# Patient Record
Sex: MALE | Race: WHITE | NOT HISPANIC OR LATINO | Employment: OTHER | URBAN - METROPOLITAN AREA
[De-identification: names, ages, dates, MRNs, and addresses within clinical notes are randomized per-mention and may not be internally consistent; named-entity substitution may affect disease eponyms.]

---

## 2017-01-05 ENCOUNTER — TELEPHONE (OUTPATIENT)
Dept: UROLOGY | Facility: CLINIC | Age: 67
End: 2017-01-05

## 2017-01-05 ENCOUNTER — LAB VISIT (OUTPATIENT)
Dept: LAB | Facility: HOSPITAL | Age: 67
End: 2017-01-05
Attending: UROLOGY
Payer: MEDICARE

## 2017-01-05 DIAGNOSIS — N39.0 URINARY TRACT INFECTION, SITE UNSPECIFIED: ICD-10-CM

## 2017-01-05 DIAGNOSIS — N39.0 URINARY TRACT INFECTION, SITE UNSPECIFIED: Primary | ICD-10-CM

## 2017-01-05 PROCEDURE — 87086 URINE CULTURE/COLONY COUNT: CPT

## 2017-01-05 PROCEDURE — 87088 URINE BACTERIA CULTURE: CPT

## 2017-01-05 PROCEDURE — 87077 CULTURE AEROBIC IDENTIFY: CPT

## 2017-01-05 PROCEDURE — 87186 SC STD MICRODIL/AGAR DIL: CPT

## 2017-01-05 NOTE — TELEPHONE ENCOUNTER
----- Message from Jose L Mcallister sent at 1/5/2017 10:18 AM CST -----  Contact: Patient's wife  Mrs. Menjivar states that the patient's urine sample has been delivered to the lab. Please call Mrs. Menjivar back at [phone] if you have any questions. Thanks.

## 2017-01-05 NOTE — TELEPHONE ENCOUNTER
Spoke to pt's wife and she believes her  has a uti. He has an odor to the urine and some burning. He is going to drop off a urine sample to the lab today for a urinalysis and a culture to be done.

## 2017-01-05 NOTE — TELEPHONE ENCOUNTER
----- Message from Steve Monae sent at 1/5/2017  8:18 AM CST -----  Wife (Екатерина) called stated that the pt is claiming of burning and smell when urinating and she wants to know does she need to be his urine to be tested/pls call back at [phone]

## 2017-01-06 ENCOUNTER — TELEPHONE (OUTPATIENT)
Dept: UROLOGY | Facility: CLINIC | Age: 67
End: 2017-01-06

## 2017-01-06 NOTE — TELEPHONE ENCOUNTER
Wife called to check on urine culture. This is pending and patient will be notified on Monday. For now he can continue his medications at home and take AZO if needed.

## 2017-01-08 LAB — BACTERIA UR CULT: NORMAL

## 2017-01-09 DIAGNOSIS — N39.0 URINARY TRACT INFECTION WITH HEMATURIA, SITE UNSPECIFIED: Primary | ICD-10-CM

## 2017-01-09 DIAGNOSIS — R31.9 URINARY TRACT INFECTION WITH HEMATURIA, SITE UNSPECIFIED: Primary | ICD-10-CM

## 2017-01-09 RX ORDER — NITROFURANTOIN 25; 75 MG/1; MG/1
100 CAPSULE ORAL 2 TIMES DAILY
Qty: 20 CAPSULE | Refills: 0 | Status: SHIPPED | OUTPATIENT
Start: 2017-01-09 | End: 2017-01-19

## 2017-01-09 NOTE — TELEPHONE ENCOUNTER
Orders for the Macrobid for the urine culture results showing an infection. Pt's wife was informed of urine culture results.

## 2017-01-09 NOTE — TELEPHONE ENCOUNTER
----- Message from Britni Briscoe sent at 1/9/2017  9:51 AM CST -----  Contact: Wife - Екатерина  Patient wife called to request results of recent test - Please call to render at [phone]

## 2017-01-09 NOTE — TELEPHONE ENCOUNTER
Wife informed to check with the pharmacy this afternoon for patient's medication. No follow up is needed at this time.

## 2017-01-10 ENCOUNTER — TELEPHONE (OUTPATIENT)
Dept: UROLOGY | Facility: CLINIC | Age: 67
End: 2017-01-10

## 2017-07-21 PROBLEM — L02.212 ABSCESS OF BACK: Status: ACTIVE | Noted: 2017-07-21

## 2017-07-31 PROBLEM — S21.209A OPEN BACK WOUND: Status: ACTIVE | Noted: 2017-07-31

## 2020-02-03 PROBLEM — I83.019 VENOUS ULCER OF RIGHT LEG: Status: ACTIVE | Noted: 2020-02-03

## 2020-02-03 PROBLEM — L02.212 ABSCESS OF BACK: Status: RESOLVED | Noted: 2017-07-21 | Resolved: 2020-02-03

## 2020-02-03 PROBLEM — L97.919 VENOUS ULCER OF RIGHT LEG: Status: ACTIVE | Noted: 2020-02-03

## 2021-01-15 ENCOUNTER — IMMUNIZATION (OUTPATIENT)
Dept: FAMILY MEDICINE | Facility: CLINIC | Age: 71
End: 2021-01-15
Payer: MEDICARE

## 2021-01-15 DIAGNOSIS — Z23 NEED FOR VACCINATION: Primary | ICD-10-CM

## 2021-01-15 PROCEDURE — 91300 COVID-19, MRNA, LNP-S, PF, 30 MCG/0.3 ML DOSE VACCINE: CPT | Mod: PBBFAC | Performed by: NURSE PRACTITIONER

## 2021-02-05 ENCOUNTER — IMMUNIZATION (OUTPATIENT)
Dept: FAMILY MEDICINE | Facility: CLINIC | Age: 71
End: 2021-02-05
Payer: MEDICARE

## 2021-02-05 DIAGNOSIS — Z23 NEED FOR VACCINATION: Primary | ICD-10-CM

## 2021-02-05 PROCEDURE — 91300 COVID-19, MRNA, LNP-S, PF, 30 MCG/0.3 ML DOSE VACCINE: CPT | Mod: PBBFAC | Performed by: FAMILY MEDICINE

## 2021-02-05 PROCEDURE — 0002A COVID-19, MRNA, LNP-S, PF, 30 MCG/0.3 ML DOSE VACCINE: CPT | Mod: PBBFAC | Performed by: FAMILY MEDICINE

## 2021-03-08 ENCOUNTER — OFFICE VISIT (OUTPATIENT)
Dept: PODIATRY | Facility: CLINIC | Age: 71
End: 2021-03-08
Payer: MEDICARE

## 2021-03-08 VITALS — WEIGHT: 315 LBS | HEIGHT: 69 IN | BODY MASS INDEX: 46.65 KG/M2

## 2021-03-08 DIAGNOSIS — E66.01 MORBID OBESITY WITH BMI OF 50.0-59.9, ADULT: ICD-10-CM

## 2021-03-08 DIAGNOSIS — L84 HELOMA MOLLE: ICD-10-CM

## 2021-03-08 DIAGNOSIS — L84 CORN OR CALLUS: ICD-10-CM

## 2021-03-08 DIAGNOSIS — M20.41 HAMMER TOES OF BOTH FEET: ICD-10-CM

## 2021-03-08 DIAGNOSIS — L98.8 MACERATION OF SKIN: ICD-10-CM

## 2021-03-08 DIAGNOSIS — E11.42 DIABETIC POLYNEUROPATHY ASSOCIATED WITH TYPE 2 DIABETES MELLITUS: Primary | ICD-10-CM

## 2021-03-08 DIAGNOSIS — M20.42 HAMMER TOES OF BOTH FEET: ICD-10-CM

## 2021-03-08 DIAGNOSIS — R20.2 PARESTHESIA OF FOOT, BILATERAL: ICD-10-CM

## 2021-03-08 PROCEDURE — 99204 PR OFFICE/OUTPT VISIT, NEW, LEVL IV, 45-59 MIN: ICD-10-PCS | Mod: 25,S$GLB,, | Performed by: PODIATRIST

## 2021-03-08 PROCEDURE — 11721 DEBRIDE NAIL 6 OR MORE: CPT | Mod: 59,Q9,S$GLB, | Performed by: PODIATRIST

## 2021-03-08 PROCEDURE — 3288F FALL RISK ASSESSMENT DOCD: CPT | Mod: CPTII,S$GLB,, | Performed by: PODIATRIST

## 2021-03-08 PROCEDURE — 1126F PR PAIN SEVERITY QUANTIFIED, NO PAIN PRESENT: ICD-10-PCS | Mod: S$GLB,,, | Performed by: PODIATRIST

## 2021-03-08 PROCEDURE — 3008F PR BODY MASS INDEX (BMI) DOCUMENTED: ICD-10-PCS | Mod: CPTII,S$GLB,, | Performed by: PODIATRIST

## 2021-03-08 PROCEDURE — 1126F AMNT PAIN NOTED NONE PRSNT: CPT | Mod: S$GLB,,, | Performed by: PODIATRIST

## 2021-03-08 PROCEDURE — 99999 PR PBB SHADOW E&M-EST. PATIENT-LVL II: CPT | Mod: PBBFAC,,, | Performed by: PODIATRIST

## 2021-03-08 PROCEDURE — 3288F PR FALLS RISK ASSESSMENT DOCUMENTED: ICD-10-PCS | Mod: CPTII,S$GLB,, | Performed by: PODIATRIST

## 2021-03-08 PROCEDURE — 3044F PR MOST RECENT HEMOGLOBIN A1C LEVEL <7.0%: ICD-10-PCS | Mod: CPTII,S$GLB,, | Performed by: PODIATRIST

## 2021-03-08 PROCEDURE — 1159F PR MEDICATION LIST DOCUMENTED IN MEDICAL RECORD: ICD-10-PCS | Mod: S$GLB,,, | Performed by: PODIATRIST

## 2021-03-08 PROCEDURE — 11721 PR DEBRIDEMENT OF NAILS, 6 OR MORE: ICD-10-PCS | Mod: 59,Q9,S$GLB, | Performed by: PODIATRIST

## 2021-03-08 PROCEDURE — 99204 OFFICE O/P NEW MOD 45 MIN: CPT | Mod: 25,S$GLB,, | Performed by: PODIATRIST

## 2021-03-08 PROCEDURE — 11056 PR TRIM BENIGN HYPERKERATOTIC SKIN LESION,2-4: ICD-10-PCS | Mod: Q9,S$GLB,, | Performed by: PODIATRIST

## 2021-03-08 PROCEDURE — 1101F PR PT FALLS ASSESS DOC 0-1 FALLS W/OUT INJ PAST YR: ICD-10-PCS | Mod: CPTII,S$GLB,, | Performed by: PODIATRIST

## 2021-03-08 PROCEDURE — 99999 PR PBB SHADOW E&M-EST. PATIENT-LVL II: ICD-10-PCS | Mod: PBBFAC,,, | Performed by: PODIATRIST

## 2021-03-08 PROCEDURE — 1159F MED LIST DOCD IN RCRD: CPT | Mod: S$GLB,,, | Performed by: PODIATRIST

## 2021-03-08 PROCEDURE — 11056 PARNG/CUTG B9 HYPRKR LES 2-4: CPT | Mod: Q9,S$GLB,, | Performed by: PODIATRIST

## 2021-03-08 PROCEDURE — 3008F BODY MASS INDEX DOCD: CPT | Mod: CPTII,S$GLB,, | Performed by: PODIATRIST

## 2021-03-08 PROCEDURE — 3044F HG A1C LEVEL LT 7.0%: CPT | Mod: CPTII,S$GLB,, | Performed by: PODIATRIST

## 2021-03-08 PROCEDURE — 1101F PT FALLS ASSESS-DOCD LE1/YR: CPT | Mod: CPTII,S$GLB,, | Performed by: PODIATRIST

## 2021-06-21 ENCOUNTER — OFFICE VISIT (OUTPATIENT)
Dept: PODIATRY | Facility: CLINIC | Age: 71
End: 2021-06-21
Payer: MEDICARE

## 2021-06-21 VITALS — WEIGHT: 315 LBS | BODY MASS INDEX: 53.03 KG/M2

## 2021-06-21 DIAGNOSIS — E11.621 DIABETIC ULCER OF TOE OF RIGHT FOOT ASSOCIATED WITH TYPE 2 DIABETES MELLITUS, LIMITED TO BREAKDOWN OF SKIN: Primary | ICD-10-CM

## 2021-06-21 DIAGNOSIS — E11.42 DIABETIC POLYNEUROPATHY ASSOCIATED WITH TYPE 2 DIABETES MELLITUS: ICD-10-CM

## 2021-06-21 DIAGNOSIS — L97.511 DIABETIC ULCER OF TOE OF RIGHT FOOT ASSOCIATED WITH TYPE 2 DIABETES MELLITUS, LIMITED TO BREAKDOWN OF SKIN: Primary | ICD-10-CM

## 2021-06-21 PROCEDURE — 87070 CULTURE OTHR SPECIMN AEROBIC: CPT | Performed by: PODIATRIST

## 2021-06-21 PROCEDURE — 1101F PT FALLS ASSESS-DOCD LE1/YR: CPT | Mod: CPTII,S$GLB,, | Performed by: PODIATRIST

## 2021-06-21 PROCEDURE — 87075 CULTR BACTERIA EXCEPT BLOOD: CPT | Performed by: PODIATRIST

## 2021-06-21 PROCEDURE — 99999 PR PBB SHADOW E&M-EST. PATIENT-LVL II: ICD-10-PCS | Mod: PBBFAC,,, | Performed by: PODIATRIST

## 2021-06-21 PROCEDURE — 99999 PR PBB SHADOW E&M-EST. PATIENT-LVL II: CPT | Mod: PBBFAC,,, | Performed by: PODIATRIST

## 2021-06-21 PROCEDURE — 3288F PR FALLS RISK ASSESSMENT DOCUMENTED: ICD-10-PCS | Mod: CPTII,S$GLB,, | Performed by: PODIATRIST

## 2021-06-21 PROCEDURE — 3288F FALL RISK ASSESSMENT DOCD: CPT | Mod: CPTII,S$GLB,, | Performed by: PODIATRIST

## 2021-06-21 PROCEDURE — 3044F PR MOST RECENT HEMOGLOBIN A1C LEVEL <7.0%: ICD-10-PCS | Mod: CPTII,S$GLB,, | Performed by: PODIATRIST

## 2021-06-21 PROCEDURE — 1101F PR PT FALLS ASSESS DOC 0-1 FALLS W/OUT INJ PAST YR: ICD-10-PCS | Mod: CPTII,S$GLB,, | Performed by: PODIATRIST

## 2021-06-21 PROCEDURE — 1126F AMNT PAIN NOTED NONE PRSNT: CPT | Mod: S$GLB,,, | Performed by: PODIATRIST

## 2021-06-21 PROCEDURE — 99213 PR OFFICE/OUTPT VISIT, EST, LEVL III, 20-29 MIN: ICD-10-PCS | Mod: S$GLB,,, | Performed by: PODIATRIST

## 2021-06-21 PROCEDURE — 1159F PR MEDICATION LIST DOCUMENTED IN MEDICAL RECORD: ICD-10-PCS | Mod: S$GLB,,, | Performed by: PODIATRIST

## 2021-06-21 PROCEDURE — 87077 CULTURE AEROBIC IDENTIFY: CPT | Performed by: PODIATRIST

## 2021-06-21 PROCEDURE — 99213 OFFICE O/P EST LOW 20 MIN: CPT | Mod: S$GLB,,, | Performed by: PODIATRIST

## 2021-06-21 PROCEDURE — 3008F PR BODY MASS INDEX (BMI) DOCUMENTED: ICD-10-PCS | Mod: CPTII,S$GLB,, | Performed by: PODIATRIST

## 2021-06-21 PROCEDURE — 87186 SC STD MICRODIL/AGAR DIL: CPT | Performed by: PODIATRIST

## 2021-06-21 PROCEDURE — 3008F BODY MASS INDEX DOCD: CPT | Mod: CPTII,S$GLB,, | Performed by: PODIATRIST

## 2021-06-21 PROCEDURE — 3044F HG A1C LEVEL LT 7.0%: CPT | Mod: CPTII,S$GLB,, | Performed by: PODIATRIST

## 2021-06-21 PROCEDURE — 1159F MED LIST DOCD IN RCRD: CPT | Mod: S$GLB,,, | Performed by: PODIATRIST

## 2021-06-21 PROCEDURE — 1126F PR PAIN SEVERITY QUANTIFIED, NO PAIN PRESENT: ICD-10-PCS | Mod: S$GLB,,, | Performed by: PODIATRIST

## 2021-06-24 ENCOUNTER — PATIENT MESSAGE (OUTPATIENT)
Dept: PODIATRY | Facility: CLINIC | Age: 71
End: 2021-06-24

## 2021-06-24 DIAGNOSIS — E11.628 DIABETIC FOOT INFECTION: Primary | ICD-10-CM

## 2021-06-24 DIAGNOSIS — L08.9 DIABETIC FOOT INFECTION: Primary | ICD-10-CM

## 2021-06-24 LAB — BACTERIA SPEC AEROBE CULT: ABNORMAL

## 2021-06-24 RX ORDER — AMPICILLIN 250 MG/1
250 CAPSULE ORAL 4 TIMES DAILY
Qty: 28 CAPSULE | Refills: 0 | Status: SHIPPED | OUTPATIENT
Start: 2021-06-24 | End: 2021-08-06 | Stop reason: ALTCHOICE

## 2021-06-25 ENCOUNTER — PATIENT MESSAGE (OUTPATIENT)
Dept: PODIATRY | Facility: CLINIC | Age: 71
End: 2021-06-25

## 2021-06-25 ENCOUNTER — TELEPHONE (OUTPATIENT)
Dept: PODIATRY | Facility: CLINIC | Age: 71
End: 2021-06-25

## 2021-06-25 LAB — BACTERIA SPEC ANAEROBE CULT: NORMAL

## 2021-06-30 ENCOUNTER — OFFICE VISIT (OUTPATIENT)
Dept: PODIATRY | Facility: CLINIC | Age: 71
End: 2021-06-30
Payer: MEDICARE

## 2021-06-30 VITALS — HEIGHT: 69 IN | BODY MASS INDEX: 46.65 KG/M2 | WEIGHT: 315 LBS

## 2021-06-30 DIAGNOSIS — Z87.2 HEALED ULCER OF RIGHT FOOT ON EXAMINATION: Primary | ICD-10-CM

## 2021-06-30 DIAGNOSIS — E11.42 DIABETIC POLYNEUROPATHY ASSOCIATED WITH TYPE 2 DIABETES MELLITUS: ICD-10-CM

## 2021-06-30 PROCEDURE — 3288F FALL RISK ASSESSMENT DOCD: CPT | Mod: CPTII,S$GLB,, | Performed by: PODIATRIST

## 2021-06-30 PROCEDURE — 99999 PR PBB SHADOW E&M-EST. PATIENT-LVL II: ICD-10-PCS | Mod: PBBFAC,,, | Performed by: PODIATRIST

## 2021-06-30 PROCEDURE — 99999 PR PBB SHADOW E&M-EST. PATIENT-LVL II: CPT | Mod: PBBFAC,,, | Performed by: PODIATRIST

## 2021-06-30 PROCEDURE — 3044F HG A1C LEVEL LT 7.0%: CPT | Mod: CPTII,S$GLB,, | Performed by: PODIATRIST

## 2021-06-30 PROCEDURE — 99212 PR OFFICE/OUTPT VISIT, EST, LEVL II, 10-19 MIN: ICD-10-PCS | Mod: S$GLB,,, | Performed by: PODIATRIST

## 2021-06-30 PROCEDURE — 1101F PR PT FALLS ASSESS DOC 0-1 FALLS W/OUT INJ PAST YR: ICD-10-PCS | Mod: CPTII,S$GLB,, | Performed by: PODIATRIST

## 2021-06-30 PROCEDURE — 1125F PR PAIN SEVERITY QUANTIFIED, PAIN PRESENT: ICD-10-PCS | Mod: S$GLB,,, | Performed by: PODIATRIST

## 2021-06-30 PROCEDURE — 3008F BODY MASS INDEX DOCD: CPT | Mod: CPTII,S$GLB,, | Performed by: PODIATRIST

## 2021-06-30 PROCEDURE — 3288F PR FALLS RISK ASSESSMENT DOCUMENTED: ICD-10-PCS | Mod: CPTII,S$GLB,, | Performed by: PODIATRIST

## 2021-06-30 PROCEDURE — 3008F PR BODY MASS INDEX (BMI) DOCUMENTED: ICD-10-PCS | Mod: CPTII,S$GLB,, | Performed by: PODIATRIST

## 2021-06-30 PROCEDURE — 1125F AMNT PAIN NOTED PAIN PRSNT: CPT | Mod: S$GLB,,, | Performed by: PODIATRIST

## 2021-06-30 PROCEDURE — 1159F MED LIST DOCD IN RCRD: CPT | Mod: S$GLB,,, | Performed by: PODIATRIST

## 2021-06-30 PROCEDURE — 1101F PT FALLS ASSESS-DOCD LE1/YR: CPT | Mod: CPTII,S$GLB,, | Performed by: PODIATRIST

## 2021-06-30 PROCEDURE — 3044F PR MOST RECENT HEMOGLOBIN A1C LEVEL <7.0%: ICD-10-PCS | Mod: CPTII,S$GLB,, | Performed by: PODIATRIST

## 2021-06-30 PROCEDURE — 1159F PR MEDICATION LIST DOCUMENTED IN MEDICAL RECORD: ICD-10-PCS | Mod: S$GLB,,, | Performed by: PODIATRIST

## 2021-06-30 PROCEDURE — 99212 OFFICE O/P EST SF 10 MIN: CPT | Mod: S$GLB,,, | Performed by: PODIATRIST

## 2021-07-01 ENCOUNTER — PATIENT MESSAGE (OUTPATIENT)
Dept: PODIATRY | Facility: CLINIC | Age: 71
End: 2021-07-01

## 2021-12-02 ENCOUNTER — IMMUNIZATION (OUTPATIENT)
Dept: FAMILY MEDICINE | Facility: CLINIC | Age: 71
End: 2021-12-02
Payer: MEDICARE

## 2021-12-02 DIAGNOSIS — Z23 NEED FOR VACCINATION: Primary | ICD-10-CM

## 2021-12-02 PROCEDURE — 0004A COVID-19, MRNA, LNP-S, PF, 30 MCG/0.3 ML DOSE VACCINE: CPT | Mod: PBBFAC | Performed by: RADIOLOGY

## 2022-07-24 PROBLEM — E11.9 TYPE 2 DIABETES MELLITUS, WITHOUT LONG-TERM CURRENT USE OF INSULIN: Status: RESOLVED | Noted: 2022-07-24 | Resolved: 2022-07-24

## 2022-07-24 PROBLEM — E55.9 VITAMIN D DEFICIENCY, UNSPECIFIED: Status: ACTIVE | Noted: 2022-07-24

## 2022-07-24 PROBLEM — Z86.79 HISTORY OF ATRIAL FIBRILLATION: Status: RESOLVED | Noted: 2022-07-24 | Resolved: 2022-07-24

## 2022-07-24 PROBLEM — L97.919 VENOUS ULCER OF RIGHT LEG: Status: RESOLVED | Noted: 2020-02-03 | Resolved: 2022-07-24

## 2022-07-24 PROBLEM — Z79.01 CHRONIC ANTICOAGULATION: Status: ACTIVE | Noted: 2022-07-24

## 2022-07-24 PROBLEM — R80.9 MICROALBUMINURIA: Status: ACTIVE | Noted: 2022-07-24

## 2022-07-24 PROBLEM — E03.9 ACQUIRED HYPOTHYROIDISM: Status: ACTIVE | Noted: 2022-07-24

## 2022-07-24 PROBLEM — E11.42 DIABETIC PERIPHERAL NEUROPATHY: Status: ACTIVE | Noted: 2022-07-24

## 2022-07-24 PROBLEM — E87.6 HYPOKALEMIA: Status: ACTIVE | Noted: 2022-07-24

## 2022-07-24 PROBLEM — Z98.890 HISTORY OF VERTEBRAL FRACTURE REPAIR: Status: ACTIVE | Noted: 2022-07-24

## 2022-07-24 PROBLEM — R60.0 BILATERAL LEG EDEMA: Status: ACTIVE | Noted: 2022-07-24

## 2022-07-24 PROBLEM — D50.9 IRON DEFICIENCY ANEMIA, UNSPECIFIED: Status: ACTIVE | Noted: 2022-07-24

## 2022-07-24 PROBLEM — Z87.81 HISTORY OF VERTEBRAL FRACTURE REPAIR: Status: ACTIVE | Noted: 2022-07-24

## 2022-07-24 PROBLEM — S21.209A OPEN BACK WOUND: Status: RESOLVED | Noted: 2017-07-31 | Resolved: 2022-07-24

## 2022-07-24 PROBLEM — E11.42 TYPE 2 DIABETES MELLITUS WITH DIABETIC POLYNEUROPATHY, WITHOUT LONG-TERM CURRENT USE OF INSULIN: Status: ACTIVE | Noted: 2022-07-24

## 2022-07-24 PROBLEM — I48.20 CHRONIC ATRIAL FIBRILLATION: Status: ACTIVE | Noted: 2022-07-24

## 2022-07-24 PROBLEM — I83.019 VENOUS ULCER OF RIGHT LEG: Status: RESOLVED | Noted: 2020-02-03 | Resolved: 2022-07-24

## 2022-07-24 PROBLEM — Z86.72 HISTORY OF THROMBOPHLEBITIS: Status: ACTIVE | Noted: 2022-07-24

## 2022-07-24 PROBLEM — Z86.79 HISTORY OF ATRIAL FIBRILLATION: Status: ACTIVE | Noted: 2022-07-24

## 2022-07-24 PROBLEM — E87.1 HYPONATREMIA: Status: ACTIVE | Noted: 2022-07-24

## 2022-07-24 PROBLEM — K21.9 GERD (GASTROESOPHAGEAL REFLUX DISEASE): Status: ACTIVE | Noted: 2022-07-24

## 2022-07-24 PROBLEM — Z86.718 HISTORY OF DVT (DEEP VEIN THROMBOSIS): Status: ACTIVE | Noted: 2022-07-24

## 2022-07-24 PROBLEM — Z85.850 HISTORY OF THYROID CANCER: Status: ACTIVE | Noted: 2022-07-24

## 2022-07-24 PROBLEM — E11.9 TYPE 2 DIABETES MELLITUS, WITHOUT LONG-TERM CURRENT USE OF INSULIN: Status: ACTIVE | Noted: 2022-07-24

## 2022-07-27 PROBLEM — G47.33 OSA ON CPAP: Status: ACTIVE | Noted: 2022-07-27

## 2022-07-29 PROBLEM — I48.0 PAROXYSMAL ATRIAL FIBRILLATION: Status: ACTIVE | Noted: 2022-07-24

## 2022-07-29 PROBLEM — Z86.79 S/P ABLATION OF ATRIAL FIBRILLATION: Status: ACTIVE | Noted: 2022-07-29

## 2022-07-29 PROBLEM — Z98.890 S/P ABLATION OF ATRIAL FIBRILLATION: Status: ACTIVE | Noted: 2022-07-29

## 2023-12-19 ENCOUNTER — OFFICE VISIT (OUTPATIENT)
Dept: PODIATRY | Facility: CLINIC | Age: 73
End: 2023-12-19
Payer: MEDICARE

## 2023-12-19 VITALS — BODY MASS INDEX: 42.66 KG/M2 | WEIGHT: 315 LBS | HEIGHT: 72 IN

## 2023-12-19 DIAGNOSIS — R20.2 PARESTHESIA OF FOOT, BILATERAL: ICD-10-CM

## 2023-12-19 DIAGNOSIS — I87.2 VENOUS INSUFFICIENCY (CHRONIC) (PERIPHERAL): ICD-10-CM

## 2023-12-19 DIAGNOSIS — L98.8 MACERATION OF SKIN: ICD-10-CM

## 2023-12-19 DIAGNOSIS — E11.42 DIABETIC POLYNEUROPATHY ASSOCIATED WITH TYPE 2 DIABETES MELLITUS: Primary | ICD-10-CM

## 2023-12-19 PROCEDURE — 3288F FALL RISK ASSESSMENT DOCD: CPT | Mod: CPTII,S$GLB,, | Performed by: PODIATRIST

## 2023-12-19 PROCEDURE — 3061F NEG MICROALBUMINURIA REV: CPT | Mod: CPTII,S$GLB,, | Performed by: PODIATRIST

## 2023-12-19 PROCEDURE — 99999 PR PBB SHADOW E&M-EST. PATIENT-LVL IV: CPT | Mod: PBBFAC,,, | Performed by: PODIATRIST

## 2023-12-19 PROCEDURE — 4010F ACE/ARB THERAPY RXD/TAKEN: CPT | Mod: CPTII,S$GLB,, | Performed by: PODIATRIST

## 2023-12-19 PROCEDURE — 99999 PR PBB SHADOW E&M-EST. PATIENT-LVL IV: ICD-10-PCS | Mod: PBBFAC,,, | Performed by: PODIATRIST

## 2023-12-19 PROCEDURE — 1101F PR PT FALLS ASSESS DOC 0-1 FALLS W/OUT INJ PAST YR: ICD-10-PCS | Mod: CPTII,S$GLB,, | Performed by: PODIATRIST

## 2023-12-19 PROCEDURE — 3066F PR DOCUMENTATION OF TREATMENT FOR NEPHROPATHY: ICD-10-PCS | Mod: CPTII,S$GLB,, | Performed by: PODIATRIST

## 2023-12-19 PROCEDURE — 4010F PR ACE/ARB THEARPY RXD/TAKEN: ICD-10-PCS | Mod: CPTII,S$GLB,, | Performed by: PODIATRIST

## 2023-12-19 PROCEDURE — 3061F PR NEG MICROALBUMINURIA RESULT DOCUMENTED/REVIEW: ICD-10-PCS | Mod: CPTII,S$GLB,, | Performed by: PODIATRIST

## 2023-12-19 PROCEDURE — 1126F AMNT PAIN NOTED NONE PRSNT: CPT | Mod: CPTII,S$GLB,, | Performed by: PODIATRIST

## 2023-12-19 PROCEDURE — 1159F MED LIST DOCD IN RCRD: CPT | Mod: CPTII,S$GLB,, | Performed by: PODIATRIST

## 2023-12-19 PROCEDURE — 3044F PR MOST RECENT HEMOGLOBIN A1C LEVEL <7.0%: ICD-10-PCS | Mod: CPTII,S$GLB,, | Performed by: PODIATRIST

## 2023-12-19 PROCEDURE — 3066F NEPHROPATHY DOC TX: CPT | Mod: CPTII,S$GLB,, | Performed by: PODIATRIST

## 2023-12-19 PROCEDURE — 99214 PR OFFICE/OUTPT VISIT, EST, LEVL IV, 30-39 MIN: ICD-10-PCS | Mod: S$GLB,,, | Performed by: PODIATRIST

## 2023-12-19 PROCEDURE — 1101F PT FALLS ASSESS-DOCD LE1/YR: CPT | Mod: CPTII,S$GLB,, | Performed by: PODIATRIST

## 2023-12-19 PROCEDURE — 1159F PR MEDICATION LIST DOCUMENTED IN MEDICAL RECORD: ICD-10-PCS | Mod: CPTII,S$GLB,, | Performed by: PODIATRIST

## 2023-12-19 PROCEDURE — 3044F HG A1C LEVEL LT 7.0%: CPT | Mod: CPTII,S$GLB,, | Performed by: PODIATRIST

## 2023-12-19 PROCEDURE — 99214 OFFICE O/P EST MOD 30 MIN: CPT | Mod: S$GLB,,, | Performed by: PODIATRIST

## 2023-12-19 PROCEDURE — 3288F PR FALLS RISK ASSESSMENT DOCUMENTED: ICD-10-PCS | Mod: CPTII,S$GLB,, | Performed by: PODIATRIST

## 2023-12-19 PROCEDURE — 1126F PR PAIN SEVERITY QUANTIFIED, NO PAIN PRESENT: ICD-10-PCS | Mod: CPTII,S$GLB,, | Performed by: PODIATRIST

## 2023-12-19 NOTE — PROGRESS NOTES
Subjective:      Patient ID: Hai Menjivar is a 73 y.o. male.    Chief Complaint: Nail Care (Nail care for diabetic shoes, neuropathy, burning, pain)    Hai is a 73 y.o. male who presents to the clinic for evaluation and treatment of diabetic feet. Hai has a past medical history of a Atrial Fibrillation With H/O RVR (07/27/2022), a CAD With H/O Stenting, a Chronic Anticoagulation With Eliquis, a H/O Myocardial Infarction In 2012, a Recurrent RLE DVT 2012 With Giuliana Filter (###), b Chronic Hypokalemia, b Chronic Hyponatremia (07/27/2022), b H/O RUE PICC Line Associated Basilic Vein Thrombophlebitis In 2016, b Hypertension, c Hypercholesterolemia With Low HDL, c Mild Hypertriglyceridemia, d Type 2 Diabetes Mellitus, e H/O Thyroid Cancer S/P Thyroidectomy In 1977, e Hypothyroidism, f Morbid obesity, g Chronic Iron Deficiency Anemia (07/27/2022), i ANDREI On BiPAP, j Diverticulitis VS Infectious Enterocolitis In 2016, j Gastroesophageal Reflux Disease, j H/O Diminutive Recto-Sigmoid Colon Polyps On 2/3/17 TC, j Medium Size Ventral Abdominal Hernia, j Sigmoid And Descending Colon Diverticulosis, k H/O Low Testosterone Levels, k H/O Right Pyelonephritis With ESBL E. Coli Bacteremia In 2016, k Microalbuminuria, k Nephrolithiasis, k Right Urolithiasis With Right Hydroureteronephrosis In 2016, k Scrotal Cellulitis In 4/4/16 And 11/11/16, l H/O Thoracic Spinal Fracture S/P Surgeries X 2 In 2012, l Lumbar Ankylosing Spondylitis And DDD, m Bilateral Lower Extremity Diabetic Peripheral Neuropathy, m Borderline Vitamin B12 Deficiency (###), q Chronic Bilateral Lower Extremity Venous Stasis Edema, q Right Facial Lesion, q Vitamin D Deficiency, Wellness Visit (07/27/2023), and Wellness Visit 10/09/2023. Patient relates no major issues with today's exam.  States lower extremity paresthesias, secondary to neuropathy, continue to persist while at rest.  Has not attempted to self treat.  Notes continued control over his  blood glucose.  Denies any additional pedal complaints.      PCP: aCyetano Kerr MD    Date Last Seen by PCP: 7/23    Hemoglobin A1C   Date Value Ref Range Status   09/21/2022 6.1 (H) 0.0 - 5.6 % Final     Comment:     Reference Interval:  5.0 - 5.6 Normal   5.7 - 6.4 High Risk   > 6.5 Diabetic      Hgb A1c results are standardized based on the (NGSP) National   Glycohemoglobin Standardization Program.      Hemoglobin A1C levels are related to mean serum/plasma glucose   during the preceding 2-3 months.        07/21/2022 6.0 (H) 0.0 - 5.6 % Final     Comment:     Reference Interval:  5.0 - 5.6 Normal   5.7 - 6.4 High Risk   > 6.5 Diabetic      Hgb A1c results are standardized based on the (NGSP) National   Glycohemoglobin Standardization Program.      Hemoglobin A1C levels are related to mean serum/plasma glucose   during the preceding 2-3 months.        02/09/2022 6.6 (H) 0.0 - 5.6 % Final     Comment:     Reference Interval:  5.0 - 5.6 Normal   5.7 - 6.4 High Risk   > 6.5 Diabetic      Hgb A1c results are standardized based on the (NGSP) National   Glycohemoglobin Standardization Program.      Hemoglobin A1C levels are related to mean serum/plasma glucose   during the preceding 2-3 months.                Past Medical History:   Diagnosis Date    a Atrial Fibrillation With H/O RVR 07/27/2022    Dr. Gigi Luo; Dr. Duarte Yip Perform A RFA On 7/28/22    a CAD With H/O Stenting     Dr. Gigi Luo; His Last Stent Was In 2012    a Chronic Anticoagulation With Eliquis     Dr. Gigi Luo    a H/O Myocardial Infarction In 2012     Dr. Gigi Luo    a Recurrent RLE DVT 2012 With Todd Filter ###    b Chronic Hypokalemia     On KCl 10 mEq Daily; Likely Due To His Diuretics    b Chronic Hyponatremia 07/27/2022 7/27/22 RXd Micardis 80 Mg qAM And D/Cd Losartan/HCTZ 100/25 g qAM    b H/O RUE PICC Line Associated Basilic Vein Thrombophlebitis In 2016     [facility] 12/11/16-12/15/16 Stay Included This,  "Was TXd With Anticoagulation For 7 Days    b Hypertension     8/17/22 Added Dyazide qAM With Labs In 2 Weeks    c Hypercholesterolemia With Low HDL     11/30/18 Added Zetia 10 Mg Daily (But This Was Too Expensive); On Pravastatin 20 Mg qHS; 8/8/16 Increased 20 Mg Pravastatin To 80 Mg qHS; Higher Dose Pravastatin, And Zocor And Lipitor Caused The Side Effect Of Chest Pain    c Mild Hypertriglyceridemia     d Type 2 Diabetes Mellitus     8/19/20 Increased Metformin To 1000 Mg BID; 12/23/16 Metformin Currently On Hold But His FBGs Have Been  Off Of It    e H/O Thyroid Cancer S/P Thyroidectomy In 1977     e Hypothyroidism     On Levothyroxine 225 Mcg qAM    f Morbid obesity     g Chronic Iron Deficiency Anemia 07/27/2022 7/27/22 Referred To Dr. RUSTY Abbott; 2/10/22 RXd Ferrous Sulfate 325 (65 FE) twice weekly; FOBT; and re-check labs    i ANDREI On BiPAP     j Diverticulitis VS Infectious Enterocolitis In 2016     Dr. RUSTY Abbott On 12/1/16 D/Cd Flagyl, Continued Cipro X 10 Days, And Is Scheduling A TC    j Gastroesophageal Reflux Disease     Dr. RUSTY Abbott    j H/O Diminutive Recto-Sigmoid Colon Polyps On 2/3/17 TC     Dr. RUSTY Abbott: "Repeat TC In 10 YRs If Hyperplastic"    j Medium Size Ventral Abdominal Hernia     j Sigmoid And Descending Colon Diverticulosis     Dr. RUSTY Abbott    k H/O Low Testosterone Levels     But His 1/27/20 And 11/27/18 Testosterone Levels = Normal On No Testosterone Supplement; 06/2014 Androgel Was D/Cd Due To His CAD    k H/O Right Pyelonephritis With ESBL E. Coli Bacteremia In 2016     [facility] 12/11/16-12/15/16 Stay For This: Was D/Cd With IV Invanz Infusions X 7 Days Via RUE PICC Line    k Microalbuminuria     On Losartan/HCTZ 100/25 Mg qAM    k Nephrolithiasis     Dr. Boo Cha    k Right Urolithiasis With Right Hydroureteronephrosis In 2016     Dr. Boo Cha Performed A Cystoscopy With Ureter Stent Then Ablation    k Scrotal " "Cellulitis In 16 And 16 RXd Continuing Bactrim-DS And Ceftin For 20 Days Total    l H/O Thoracic Spinal Fracture S/P Surgeries X 2 In      l Lumbar Ankylosing Spondylitis And DDD     m Bilateral Lower Extremity Diabetic Peripheral Neuropathy     2/3/21 RXd Lyrica 75 Mg BID; Neurontin Caused S/Es    m Borderline Vitamin B12 Deficiency ###    18 Vitamin B12 = 752 (210-950) On OTC Vitamin B12 1K Mcg SL Daily    q Chronic Bilateral Lower Extremity Venous Stasis Edema     q Right Facial Lesion     Dr. Kimberlee Gannon 3/31/16 Shave BXd This And Pathology Was "Verruca Vulgaris"    q Vitamin D Deficiency     On OTC D3 2K IU Daily    Wellness Visit 2023    Wellness Visit 10/09/2023        Past Surgical History:   Procedure Laterality Date    ABLATION Left 2022    Procedure: Ablation;  Surgeon: Duarte Alan III, MD;  Location: [facility];  Service: Cardiology;  Laterality: Left;    BACK SURGERY      back surgery--fused from T3-L1    CHOLECYSTECTOMY      CORONARY STENT PLACEMENT      x 2    CYST REMOVAL      benign cyst to back    [place] FILTER PLACEMENT      INSERTION OF IMPLANTABLE LOOP RECORDER  2022    Dr Luo    RECTAL SURGERY      fissure repair and hemorroidectomy    THYROIDECTOMY      THYROIDECTOMY      ULNAR NERVE TRANSPOSITION Bilateral        Family History   Problem Relation Age of Onset    Diabetes Father     Hypertension Father     Heart disease Father     Heart disease Unknown     Cancer Mother         pancreatic ?        Social History     Socioeconomic History    Marital status:      Spouse name: Екатерина    Number of children: 2   Tobacco Use    Smoking status: Former     Current packs/day: 0.00     Types: Cigarettes     Quit date: 1973     Years since quittin.0     Passive exposure: Past    Smokeless tobacco: Never   Substance and Sexual Activity    Alcohol use: No    Drug use: Yes     Types: Hydrocodone    Sexual activity: Yes     " Partners: Female     Social Determinants of Health     Financial Resource Strain: Low Risk  (5/24/2022)    Overall Financial Resource Strain (CARDIA)     Difficulty of Paying Living Expenses: Not very hard   Food Insecurity: No Food Insecurity (5/24/2022)    Hunger Vital Sign     Worried About Running Out of Food in the Last Year: Never true     Ran Out of Food in the Last Year: Never true   Transportation Needs: No Transportation Needs (5/24/2022)    PRAPARE - Transportation     Lack of Transportation (Medical): No     Lack of Transportation (Non-Medical): No   Physical Activity: Unknown (5/24/2022)    Exercise Vital Sign     Days of Exercise per Week: Patient declined   Stress: No Stress Concern Present (1/29/2021)    [employer] [place] [employer] - Occupational Stress Questionnaire     Feeling of Stress : Not at all   Social Connections: Unknown (5/24/2022)    Social Connection and Isolation Panel [NHANES]     Frequency of Communication with Friends and Family: More than three times a week     Frequency of Social Gatherings with Friends and Family: Once a week     Active Member of Clubs or Organizations: No     Attends Club or Organization Meetings: Never     Marital Status:    Housing Stability: Low Risk  (5/24/2022)    Housing Stability Vital Sign     Unable to Pay for Housing in the Last Year: No     Number of Places Lived in the Last Year: 1     Unstable Housing in the Last Year: No       Current Outpatient Medications   Medication Sig Dispense Refill    ACCU-CHEK JOSUE CONTROL SOLN Soln 1 each by Other route as needed (glucose monitoring). 1 each 11    ACCU-CHEK GUIDE GLUCOSE METER Misc       blood sugar diagnostic (ACCU-CHEK GUIDE TEST STRIPS) Strp TEST BLOOD SUGAR FOUR TIMES DAILY 400 strip 1    blood sugar diagnostic Strp Accu-Chek Josue Plus test strips      cholecalciferol, vitamin D3, 2,000 unit Tab Take 1 tablet by mouth once daily.      clopidogreL (PLAVIX) 75 mg tablet Take 75 mg  by mouth once daily.      coenzyme Q10 400 mg Cap Take 400 mg by mouth every morning.      cyanocobalamin, vitamin B-12, (VITAMIN B-12) 1,000 mcg Subl Place 1,000 mcg under the tongue once daily.      ezetimibe (ZETIA) 10 mg tablet Take 10 mg by mouth once daily.      ferrous sulfate 325 (65 FE) MG EC tablet Take 1 tablet (325 mg total) by mouth once daily. 90 tablet 1    flaxseed oil 1,000 mg Cap Take 2,000 mg by mouth 2 (two) times daily.      garlic 1,000 mg Cap Take 2,000 mg by mouth 2 (two) times a day.      lancets [facility] [place] To check BG QID times daily, to use with insurance preferred meter 100 each 3    lancing device with lancets (ACCU-CHEK FASTCLIX LANCING DEV) Kit 1 kit by Misc.(Non-Drug; Combo Route) route once daily. 1 each 0    levothyroxine (SYNTHROID) 200 MCG tablet TAKE 1 TABLET BEFORE BREAKFAST (TAKEN WITH LEVOTHYROXINE 25 MCG TO EQUAL 225 MCG DAILY) 90 tablet 1    levothyroxine (SYNTHROID) 25 MCG tablet TAKE 1 TABLET (25 MCG TOTAL) BY MOUTH BEFORE BREAKFAST. 90 tablet 3    metFORMIN (GLUCOPHAGE) 1000 MG tablet TAKE 1 TABLET (1,000 MG TOTAL) BY MOUTH 2 (TWO) TIMES DAILY. 180 tablet 3    omeprazole (PRILOSEC) 40 MG capsule TAKE 1 CAPSULE EVERY DAY 90 capsule 3    pravastatin (PRAVACHOL) 20 MG tablet TAKE 1 TABLET EVERY EVENING 90 tablet 1    acetaminophen (TYLENOL) 650 MG TbSR Take 1 tablet (650 mg total) by mouth 2 (two) times daily as needed (for mild to moderate pain). (Patient not taking: Reported on 12/19/2023)  0    alcohol swabs (BD ALCOHOL SWABS) PadM Apply 1 each topically once daily. USE  DAILY 100 each 3    clotrimazole-betamethasone 1-0.05% (LOTRISONE) cream APPLY TOPICALLY TO THE AFFECTED AREA TWICE DAILY AS NEEDED 45 g 3    docusate sodium (COLACE) 100 MG capsule Take 1 capsule by mouth nightly.      fluconazole (DIFLUCAN) 150 MG Tab Take 1 tablet (150 mg total) by mouth once a week. For 4 weeks 4 tablet 0    fluocinonide (LIDEX) 0.05 % ointment fluocinonide 0.05 % topical ointment       furosemide (LASIX) 40 MG tablet TAKE 1 TABLET (40 MG TOTAL) BY MOUTH DAILY AS NEEDED (EDEMA). 90 tablet 1    HYDROcodone-acetaminophen (NORCO) 5-325 mg per tablet Take 1 tablet by mouth daily as needed for Pain. For Diagnosis Codes M54.50 and G89.29 30 tablet 0    ipratropium (ATROVENT HFA) 17 mcg/actuation inhaler Inhale 2 puffs into the lungs every 6 (six) hours. Rescue 12.9 g 0    loratadine (CLARITIN) 10 mg tablet Take 10 mg by mouth daily as needed for Allergies.      metoprolol succinate (TOPROL-XL) 50 MG 24 hr tablet TAKE 1 TABLET TWICE DAILY 180 tablet 3    mupirocin (BACTROBAN) 2 % ointment APPLY TO THE AFFECTED AREA(S) TOPICALLY TWICE DAILY 90 g 3    nystatin (MYCOSTATIN) powder Apply topically 4 (four) times daily. To affected area 60 g 3    potassium chloride SA (K-DUR,KLOR-CON M) 10 MEQ tablet TAKE 1 TABLET (10 MEQ TOTAL) BY MOUTH DAILY AS NEEDED. 90 tablet 1    silver sulfADIAZINE 1% (SILVADENE) 1 % cream Apply to affected area twice daily for 10 days 50 g 1    SUPER B COMPLEX-VITAMIN C tablet Take 1 tablet by mouth every evening.      telmisartan (MICARDIS) 80 MG Tab TAKE 1 TABLET (80 MG TOTAL) BY MOUTH EVERY MORNING. 90 tablet 3    triamcinolone acetonide 0.1% (KENALOG) 0.1 % cream Apply topically 2 (two) times daily. 453.6 g 1    triamterene-hydrochlorothiazide 37.5-25 mg (DYAZIDE) 37.5-25 mg per capsule TAKE 1 CAPSULE BY MOUTH EVERY MORNING 90 capsule 3     No current facility-administered medications for this visit.       Review of patient's allergies indicates:   Allergen Reactions    Testosterone Other (See Comments)     Pt states myocardial infarction while on Androgel    Lipitor [atorvastatin] Other (See Comments)     Chest pain      Simvastatin Other (See Comments)     Chest pain          Review of Systems   Constitutional: Negative for chills and fever.   Cardiovascular:  Positive for leg swelling. Negative for claudication.   Skin:  Positive for color change and nail changes.    Musculoskeletal:  Positive for joint swelling. Negative for muscle cramps, muscle weakness and myalgias.   Gastrointestinal:  Negative for nausea and vomiting.   Neurological:  Positive for numbness and paresthesias.   Psychiatric/Behavioral:  Negative for altered mental status.            Objective:      Physical Exam  Constitutional:       Appearance: Normal appearance. He is obese. He is not ill-appearing.   Cardiovascular:      Pulses:           Dorsalis pedis pulses are 2+ on the right side and 2+ on the left side.        Posterior tibial pulses are 2+ on the right side and 2+ on the left side.      Comments: CFT is < 2 seconds bilateral.  Pedal hair growth is decreased bilateral. Varicosities noted bilateral.  Toes are cool to touch bilateral.    Musculoskeletal:         General: Swelling present. No tenderness.      Right lower le+ Edema present.      Left lower leg: Pitting Edema present.      Comments: Muscle strength 5/5 in all muscle groups bilateral.  No tenderness nor crepitation with ROM of foot/ankle joints bilateral.  No tenderness with palpation of bilateral foot and ankle.  Bilateral adductovarus rotation of the 5th toe.   Skin:     General: Skin is warm and dry.      Capillary Refill: Capillary refill takes 2 to 3 seconds.      Findings: No bruising, ecchymosis, erythema, signs of injury, laceration, lesion, petechiae, rash or wound.      Comments: Pedal skin appears edematous bilateral.  Hemosiderin staining noted to bilateral lower leg.  Toenails x 10 appear mycotic yet well maintained.  Maceration noted to bilateral 4th webspace. Examination of the skin reveals no evidence of significant maceration, rashes, open lesions, suspicious appearing nevi or other concerning lesions.    Neurological:      Mental Status: He is alert.      Sensory: Sensory deficit present.      Motor: No weakness or atrophy.      Comments: Protective sensation per [place] monofilament is absent bilateral.   Vibratory sensation is absent bilateral.  Light touch absent intact bilateral.               Assessment:       Encounter Diagnoses   Name Primary?    Diabetic polyneuropathy associated with type 2 diabetes mellitus Yes    Paresthesia of foot, bilateral     Venous insufficiency (chronic) (peripheral)     Maceration of skin          Plan:       Hai was seen today for nail care.    Diagnoses and all orders for this visit:    Diabetic polyneuropathy associated with type 2 diabetes mellitus  -     DIABETIC SHOES FOR HOME USE    Paresthesia of foot, bilateral    Venous insufficiency (chronic) (peripheral)  -     DIABETIC SHOES FOR HOME USE    Maceration of skin      I counseled the patient on his conditions, their implications and medical management.      Rx written for diabetic shoes/insoles to accommodate for digital deformities.    Discussed the importance of diet and exercise as they pertain to diabetes management and maintaining a healthy BMI.    Discussed a referral to Pain Management to discuss the possibility of Qutenza as a therapy for pain relief.      Recommend applying betadine to bilateral 4th webspace to minimize chances of maceration.    Shoe inspection. Diabetic Foot Education. Patient reminded of the importance of good nutrition and blood sugar control to help prevent podiatric complications of diabetes. Patient instructed on proper foot hygeine. We discussed wearing proper shoe gear, daily foot inspections, never walking without protective shoe gear, never putting sharp instruments to feet    Patient instructed to inspect his feet, wear protective shoe gear when ambulatory, moisturizer to maintain skin integrity and follow in this office in approximately 12 months, sooner p.r.n.    Follow up in about 1 year (around 12/19/2024).    Matteo Sims DPM

## 2024-01-17 ENCOUNTER — PATIENT MESSAGE (OUTPATIENT)
Dept: PODIATRY | Facility: CLINIC | Age: 74
End: 2024-01-17
Payer: MEDICARE

## 2024-02-21 ENCOUNTER — PATIENT MESSAGE (OUTPATIENT)
Dept: PODIATRY | Facility: CLINIC | Age: 74
End: 2024-02-21
Payer: MEDICARE

## 2024-05-20 PROBLEM — E11.65 TYPE 2 DIABETES MELLITUS WITH HYPERGLYCEMIA, WITHOUT LONG-TERM CURRENT USE OF INSULIN: Status: ACTIVE | Noted: 2024-05-20

## 2024-05-20 PROBLEM — Z95.5 HISTORY OF HEART ARTERY STENT: Status: ACTIVE | Noted: 2024-05-20

## 2024-06-11 ENCOUNTER — TELEPHONE (OUTPATIENT)
Dept: ENDOCRINOLOGY | Facility: CLINIC | Age: 74
End: 2024-06-11
Payer: MEDICARE

## 2024-06-11 NOTE — TELEPHONE ENCOUNTER
----- Message from Elo Esparzalin sent at 6/11/2024  3:32 PM CDT -----  Type:  Sooner Appointment Request    Caller is requesting a sooner appointment.  Caller declined first available appointment listed below.  Caller will not accept being placed on the waitlist and is requesting a message be sent to doctor.    Name of Caller:  pt's awa Willams  When is the first available appointment?  06/12  Symptoms:  referral to endo  Would the patient rather a call back or a response via [facility]? Call back  Best Call Back Number:  [phone]  Additional Information:  pt states that first available does not work for them and needs to be seen to be evaluated before starting a new medication. Appt for tomorrow has be blocked and needs to speak to a nurse about getting scheduled. Please call back and advise. Thanks!

## 2024-06-12 ENCOUNTER — OFFICE VISIT (OUTPATIENT)
Dept: ENDOCRINOLOGY | Facility: CLINIC | Age: 74
End: 2024-06-12
Payer: MEDICARE

## 2024-06-12 VITALS
WEIGHT: 315 LBS | DIASTOLIC BLOOD PRESSURE: 68 MMHG | BODY MASS INDEX: 42.66 KG/M2 | HEART RATE: 77 BPM | HEIGHT: 72 IN | SYSTOLIC BLOOD PRESSURE: 138 MMHG

## 2024-06-12 DIAGNOSIS — E11.42 TYPE 2 DIABETES MELLITUS WITH DIABETIC POLYNEUROPATHY, WITHOUT LONG-TERM CURRENT USE OF INSULIN: Primary | ICD-10-CM

## 2024-06-12 DIAGNOSIS — I25.10 CORONARY ARTERY DISEASE INVOLVING NATIVE CORONARY ARTERY OF NATIVE HEART WITHOUT ANGINA PECTORIS: ICD-10-CM

## 2024-06-12 DIAGNOSIS — I10 PRIMARY HYPERTENSION: ICD-10-CM

## 2024-06-12 DIAGNOSIS — E66.01 MORBID OBESITY WITH BMI OF 45.0-49.9, ADULT: ICD-10-CM

## 2024-06-12 DIAGNOSIS — Z85.850 HISTORY OF THYROID CANCER: ICD-10-CM

## 2024-06-12 PROCEDURE — 99999 PR PBB SHADOW E&M-EST. PATIENT-LVL V: CPT | Mod: PBBFAC,,, | Performed by: NURSE PRACTITIONER

## 2024-06-12 NOTE — PROGRESS NOTES
Subjective     Patient ID: Hai Menjivar is a 73 y.o. male.    Chief Complaint: Diabetes    HPI  Pt is a 73 y.o. wm  with a diagnosis of Type 2 diabetes mellitus diagnosed approximately 1984 , as well as chronic conditions pending review including HTN, HLP, CAD, hx of Thyroid cancer 1977 (unknown type) with thyroidectomy, ANDREI c-pap compliant. .  Other pertinent medical and social information noted includes, but not limited to: Back injury a year ago with prolonged rehab stay after surgery. .     Interim Events: June 12, 2024:  Pt and his very pleasant wife (retired LPN) arrive basically wanted a second opiniion because everyone is pushing [facility]--event though he and wife has expressed concern of his unknown type of thyroid cancer.    Adding to this is they do have a pharmacy benefit limit that this would expedite into the pharmacy gap.  And making more than 50K a years most [place] disqualifies them from pt assistance, and [facility] does not have a pt assistance program.   His A1c is 7% on metformin only.       Review of Systems   Constitutional:  Negative for appetite change and unexpected weight change.   HENT:  Negative for hearing loss and trouble swallowing.    Eyes:  Negative for photophobia and visual disturbance.   Respiratory:  Negative for cough and shortness of breath.    Cardiovascular:  Negative for chest pain and leg swelling.   Gastrointestinal:  Negative for constipation and diarrhea.   Genitourinary:  Negative for difficulty urinating and urgency.   Musculoskeletal:  Positive for back pain and gait problem. Negative for arthralgias and myalgias.   Neurological:  Negative for weakness and numbness.   Psychiatric/Behavioral:  Negative for sleep disturbance. The patient is not nervous/anxious.           Objective     Physical Exam  Constitutional:       Appearance: Normal appearance. He is obese.   HENT:      Head: Normocephalic and atraumatic.      Nose: Nose normal.      Mouth/Throat:      Mouth:  Mucous membranes are moist.      Pharynx: Oropharynx is clear.   Eyes:      Extraocular Movements: Extraocular movements intact.      Conjunctiva/sclera: Conjunctivae normal.      Pupils: Pupils are equal, round, and reactive to light.   Neck:      Vascular: No carotid bruit.   Cardiovascular:      Rate and Rhythm: Normal rate and regular rhythm.      Pulses: Normal pulses.      Heart sounds: Normal heart sounds.   Pulmonary:      Effort: Pulmonary effort is normal.      Breath sounds: Normal breath sounds.   Musculoskeletal:         General: Normal range of motion.      Cervical back: Normal range of motion and neck supple.      Right lower leg: No edema.      Left lower leg: No edema.      Comments: Arrives via motorized scooter.    Lymphadenopathy:      Cervical: No cervical adenopathy.   Skin:     General: Skin is warm and dry.   Neurological:      General: No focal deficit present.      Mental Status: He is alert and oriented to person, place, and time.   Psychiatric:         Mood and Affect: Mood normal.         Behavior: Behavior normal.         Thought Content: Thought content normal.         Judgment: Judgment normal.            /68 (BP Location: Left arm, Patient Position: Sitting, BP Method: X-Large (Manual))   Pulse 77   Ht 6' (1.829 m)   Wt (!) 161 kg (355 lb)   BMI 48.15 kg/m²     Hemoglobin A1C   Date Value Ref Range Status   05/28/2024 7.0 (H) 0.0 - 5.6 % Final     Comment:     Reference Interval:  5.0 - 5.6 Normal   5.7 - 6.4 High Risk   > 6.5 Diabetic      Hgb A1c results are standardized based on the (NGSP) National   Glycohemoglobin Standardization Program.      Hemoglobin A1C levels are related to mean serum/plasma glucose   during the preceding 2-3 months.        09/21/2022 6.1 (H) 0.0 - 5.6 % Final     Comment:     Reference Interval:  5.0 - 5.6 Normal   5.7 - 6.4 High Risk   > 6.5 Diabetic      Hgb A1c results are standardized based on the (NGSP) National   Glycohemoglobin  Standardization Program.      Hemoglobin A1C levels are related to mean serum/plasma glucose   during the preceding 2-3 months.        07/21/2022 6.0 (H) 0.0 - 5.6 % Final     Comment:     Reference Interval:  5.0 - 5.6 Normal   5.7 - 6.4 High Risk   > 6.5 Diabetic      Hgb A1c results are standardized based on the (NGSP) National   Glycohemoglobin Standardization Program.      Hemoglobin A1C levels are related to mean serum/plasma glucose   during the preceding 2-3 months.            Chemistry        Component Value Date/Time     (L) 05/28/2024 0711    K 4.8 05/28/2024 0711    CL 91 (L) 05/28/2024 0711    CO2 31 05/28/2024 0711    BUN 14 05/28/2024 0711    CREATININE 0.90 05/28/2024 0711     (H) 05/28/2024 0711        Component Value Date/Time    CALCIUM 8.4 05/28/2024 0711    ALKPHOS 66 05/28/2024 0711    AST 39 05/28/2024 0711    AST 33 04/19/2016 0715    ALT 38 05/28/2024 0711    BILITOT 1.2 05/28/2024 0711    ESTGFRAFRICA >60 07/28/2022 1048    EGFRNONAA >60 07/28/2022 1048          Lab Results   Component Value Date    CHOL 111 (L) 05/28/2024     Lab Results   Component Value Date    HDL 31 (L) 05/28/2024     Lab Results   Component Value Date    LDLCALC 43.0 (L) 05/28/2024     Lab Results   Component Value Date    TRIG 185 (H) 05/28/2024     Lab Results   Component Value Date    CHOLHDL 27.9 05/28/2024     Lab Results   Component Value Date    MICALBCREAT 6.8 05/28/2024     Lab Results   Component Value Date    TSH 0.765 05/28/2024     Vit D, 25-Hydroxy   Date Value Ref Range Status   05/28/2024 44 30 - 96 ng/mL Final     Comment:     Vitamin D deficiency.........<10 ng/mL                              Vitamin D insufficiency......10-29 ng/mL       Vitamin D sufficiency........> or equal to 30 ng/mL  Vitamin D toxicity............>100 ng/mL         Assessment and Plan     .  1. Type 2 diabetes mellitus with diabetic polyneuropathy, without long-term current use of insulin  Ambulatory  referral/consult to Endocrinology      2. H/O Thyroid Cancer S/P Thyroidectomy In 1977        3. Coronary artery disease involving native coronary artery of native heart without angina pectoris        4. Primary hypertension        5. Morbid obesity with BMI of 45.0-49.9, adult            PLAN     A1c 7% quite good for age, and hypoglycemia in known CAD pt can cause arrythmias.   WOuld consdier SGLT2--and likley decrease HTN agents--however finances are a concern.     Reassured.     ORDERS 06/12/2024    F/u primary care, welcome to return prn

## 2024-09-20 PROBLEM — D69.2 OTHER NONTHROMBOCYTOPENIC PURPURA: Status: ACTIVE | Noted: 2024-09-20

## 2024-09-20 PROBLEM — E11.51 PERIPHERAL VASCULAR DISEASE IN DIABETES MELLITUS: Status: ACTIVE | Noted: 2024-05-20

## 2025-03-10 PROBLEM — R94.39 ABNORMAL CARDIOVASCULAR STRESS TEST: Status: ACTIVE | Noted: 2025-03-10

## 2025-04-28 PROBLEM — Z79.01 CHRONIC ANTICOAGULATION: Status: RESOLVED | Noted: 2022-07-24 | Resolved: 2025-04-28

## 2025-06-02 PROBLEM — I51.89 DIASTOLIC DYSFUNCTION: Status: ACTIVE | Noted: 2025-06-02

## 2025-07-22 ENCOUNTER — OFFICE VISIT (OUTPATIENT)
Dept: PODIATRY | Facility: CLINIC | Age: 75
End: 2025-07-22
Payer: MEDICARE

## 2025-07-22 VITALS — HEIGHT: 72 IN | BODY MASS INDEX: 42.66 KG/M2 | WEIGHT: 315 LBS

## 2025-07-22 DIAGNOSIS — I87.2 VENOUS INSUFFICIENCY (CHRONIC) (PERIPHERAL): ICD-10-CM

## 2025-07-22 DIAGNOSIS — E11.42 DIABETIC POLYNEUROPATHY ASSOCIATED WITH TYPE 2 DIABETES MELLITUS: Primary | ICD-10-CM

## 2025-07-22 DIAGNOSIS — R20.2 PARESTHESIA OF FOOT, BILATERAL: ICD-10-CM

## 2025-07-22 PROCEDURE — 1101F PT FALLS ASSESS-DOCD LE1/YR: CPT | Mod: CPTII,S$GLB,, | Performed by: PODIATRIST

## 2025-07-22 PROCEDURE — 3288F FALL RISK ASSESSMENT DOCD: CPT | Mod: CPTII,S$GLB,, | Performed by: PODIATRIST

## 2025-07-22 PROCEDURE — 1159F MED LIST DOCD IN RCRD: CPT | Mod: CPTII,S$GLB,, | Performed by: PODIATRIST

## 2025-07-22 PROCEDURE — 99999 PR PBB SHADOW E&M-EST. PATIENT-LVL II: CPT | Mod: PBBFAC,,, | Performed by: PODIATRIST

## 2025-07-22 PROCEDURE — 1126F AMNT PAIN NOTED NONE PRSNT: CPT | Mod: CPTII,S$GLB,, | Performed by: PODIATRIST

## 2025-07-22 PROCEDURE — 99213 OFFICE O/P EST LOW 20 MIN: CPT | Mod: S$GLB,,, | Performed by: PODIATRIST

## 2025-07-22 NOTE — PROGRESS NOTES
Subjective:      Patient ID: Hai Menjivar is a 74 y.o. male.    Chief Complaint: Diabetic Foot Exam and Diabetes Mellitus    Hai is a 74 y.o. male who presents to the clinic for evaluation and treatment of diabetic feet. Hai has a past medical history of a Atrial Fibrillation With H/O RVR, a CAD With H/O Stenting, a Chronic Anticoagulation With Eliquis, a H/O Myocardial Infarction In 2012, a Paroxysmal Supraventricular Tachycardia, a Recurrent RLE DVT 2012 With [place] Filter (###), Anticoagulant long-term use, b Chronic Hypokalemia, b Chronic Hyponatremia, b H/O RUE PICC Line Associated Basilic Vein Thrombophlebitis In 2016, b Hypertension (06/15/2024), c Hypercholesterolemia With Low HDL, c Mild Hypertriglyceridemia, d Type 2 Diabetes Mellitus, e H/O Thyroid Cancer S/P Thyroidectomy In 1977, e Hypothyroidism, f Morbid obesity, g Chronic Iron Deficiency Anemia, i ANDREI On BiPAP, j Diverticulitis VS Infectious Enterocolitis In 2016, j Gastroesophageal Reflux Disease, j H/O Diminutive Recto-Sigmoid Colon Polyps On 2/3/17 Colonoscopy, j Medium Size Ventral Abdominal Hernia, j Sigmoid And Descending Colon Diverticulosis, k H/O Low Testosterone Levels, k H/O Right Pyelonephritis With ESBL E. Coli Bacteremia In 2016, k Microalbuminuria, k Nephrolithiasis, k Right Urolithiasis With Right Hydroureteronephrosis In 2016, k Scrotal Cellulitis In 4/4/16 And 11/11/16, l H/O Thoracic Spinal Fracture S/P Surgeries X 2 In 2012, l Lumbar Ankylosing Spondylitis And DDD, m Bilateral Lower Extremity Diabetic Peripheral Neuropathy, m Borderline Vitamin B12 Deficiency, p OU Cataracts, q Chronic Bilateral Lower Extremity Venous Stasis Edema (06/15/2024), q Right Facial Lesion, q Vitamin D Deficiency, and Wellness Visit 12/11/2024. Patient relates no major issues with today's exam.  Notes the usual swelling of the limbs, that he mitigates with use of compression stockings.  His wife faithfully applies moisturizer to the skin  of bilateral LE QD.  States neuropathy pain remains unchanged but manageable.   He continues with good control over his blood glucose.  Denies any additional pedal complaints.      PCP: Cayetano Kerr MD    Date Last Seen by PCP: 12/24    Hemoglobin A1C   Date Value Ref Range Status   01/06/2025 6.3 (H) 4.0 - 5.6 % Final     Comment:     Reference Interval:  5.0 - 5.6 Normal   5.7 - 6.4 High Risk   > 6.5 Diabetic      Hgb A1c results are standardized based on the (NGSP) National   Glycohemoglobin Standardization Program.      Hemoglobin A1C levels are related to mean serum/plasma glucose   during the preceding 2-3 months.        09/03/2024 6.8 (H) 0.0 - 5.6 % Final     Comment:     Reference Interval:  5.0 - 5.6 Normal   5.7 - 6.4 High Risk   > 6.5 Diabetic      Hgb A1c results are standardized based on the (NGSP) National   Glycohemoglobin Standardization Program.      Hemoglobin A1C levels are related to mean serum/plasma glucose   during the preceding 2-3 months.        05/28/2024 7.0 (H) 0.0 - 5.6 % Final     Comment:     Reference Interval:  5.0 - 5.6 Normal   5.7 - 6.4 High Risk   > 6.5 Diabetic      Hgb A1c results are standardized based on the (NGSP) National   Glycohemoglobin Standardization Program.      Hemoglobin A1C levels are related to mean serum/plasma glucose   during the preceding 2-3 months.                Past Medical History:   Diagnosis Date    a Atrial Fibrillation With H/O RVR     Dr. Gigi Luo; Dr. Duarte Alan Willl Perform A RFA On 7/28/22    a CAD With H/O Stenting     Dr. Gigi Luo; His Last Stent Was In 2012, LOOP    a Chronic Anticoagulation With Eliquis     Dr. Gigi Luo    a H/O Myocardial Infarction In 2012     Dr. Gigi Luo    a Paroxysmal Supraventricular Tachycardia     Dr. Gigi Luo On 9/11/24 Increased Toprol-XL To 75 Mg BID And Decreased Telmisartan To 40 Mg Daily    a Recurrent RLE DVT 2012 With [place] Filter ###    Anticoagulant long-term use     b  "Chronic Hypokalemia     On KCl 10 mEq Daily; Likely Due To His Diuretics    b Chronic Hyponatremia     6/15/24 RXd Spironolactone 50 Mg qAM And D/Cd Dyazide 37.5/25 Mg qAM; 7/27/22 RXd Micardis 80 Mg qAM And D/Cd Losartan/HCTZ 100/25 g qAM    b H/O RUE PICC Line Associated Basilic Vein Thrombophlebitis In 2016     [facility] 12/11/16-12/15/16 Stay Included This, Was TXd With Anticoagulation For 7 Days    b Hypertension 06/15/2024    6/15/24 RXd Spironolactone 50 Mg qAM And D/Cd Dyazide 37.5/25 Mg qAM, With BMP In 2 Weeks    c Hypercholesterolemia With Low HDL     11/30/18 Added Zetia 10 Mg Daily (But This Was Too Expensive); On Pravastatin 20 Mg qHS; 8/8/16 Increased 20 Mg Pravastatin To 80 Mg qHS; Higher Dose Pravastatin, And Zocor And Lipitor Caused The Side Effect Of Chest Pain    c Mild Hypertriglyceridemia     d Type 2 Diabetes Mellitus     8/19/20 Increased Metformin To 1000 Mg BID; 12/23/16 Metformin Currently On Hold But His FBGs Have Been  Off Of It    e H/O Thyroid Cancer S/P Thyroidectomy In 1977     e Hypothyroidism     On Levothyroxine 225 Mcg qAM    f Morbid obesity     g Chronic Iron Deficiency Anemia     7/27/22 Referred To Dr. RUSTY Abbott; 2/10/22 RXd Ferrous Sulfate 325 (65 FE) twice weekly; FOBT; and re-check labs    i ANDREI On BiPAP     j Diverticulitis VS Infectious Enterocolitis In 2016     Dr. RUSTY Abbott On 12/1/16 D/Cd Flagyl, Continued Cipro X 10 Days, And Is Scheduling A TC    j Gastroesophageal Reflux Disease     Dr. RUSTY Abbott    j H/O Diminutive Recto-Sigmoid Colon Polyps On 2/3/17 Colonoscopy     Dr. RUSTY Abbott: "Repeat TC In 10 YRs If Hyperplastic"    j Medium Size Ventral Abdominal Hernia     j Sigmoid And Descending Colon Diverticulosis     Dr. RUSTY Abbott    k H/O Low Testosterone Levels     But His 1/27/20 And 11/27/18 Testosterone Levels = Normal On No Testosterone Supplement; 06/2014 Androgel Was D/Cd Due To His CAD    k H/O Right " "Pyelonephritis With ESBL E. Coli Bacteremia In 2016     [facility] 12/11/16-12/15/16 Stay For This: Was D/Cd With IV Invanz Infusions X 7 Days Via RUE PICC Line    k Microalbuminuria     On Losartan/HCTZ 100/25 Mg qAM    k Nephrolithiasis     Dr. Boo Cha    k Right Urolithiasis With Right Hydroureteronephrosis In 2016     Dr. Boo Cha Performed A Cystoscopy With Ureter Stent Then Ablation    k Scrotal Cellulitis In 4/4/16 And 11/11/16 11/11/16 RXd Continuing Bactrim-DS And Ceftin For 20 Days Total    l H/O Thoracic Spinal Fracture S/P Surgeries X 2 In 2012     l Lumbar Ankylosing Spondylitis And DDD     m Bilateral Lower Extremity Diabetic Peripheral Neuropathy     2/3/21 RXd Lyrica 75 Mg BID; Neurontin Caused S/Es    m Borderline Vitamin B12 Deficiency     11/27/18 Vitamin B12 = 752 (210-950) On OTC Vitamin B12 1K Mcg SL Daily    p OU Cataracts     5/20/24 Referred To Dr. Anel Renteria    q Chronic Bilateral Lower Extremity Venous Stasis Edema 06/15/2024    q Right Facial Lesion     Dr. Kimberlee Gannon 3/31/16 Shave BXd This And Pathology Was "Verruca Vulgaris"    q Vitamin D Deficiency     On OTC D3 2K IU Daily    Wellness Visit 12/11/2024        Past Surgical History:   Procedure Laterality Date    ABLATION Left 07/28/2022    Procedure: Ablation;  Surgeon: Duarte Alan III, MD;  Location: [facility] CATH;  Service: Cardiology;  Laterality: Left;    BACK SURGERY      back surgery--fused from T3-L1    CHOLECYSTECTOMY  2007    CORONARY STENT PLACEMENT      x 2    CYST REMOVAL      benign cyst to back    [place] FILTER PLACEMENT      INSERTION OF IMPLANTABLE LOOP RECORDER  11/02/2022    Dr Luo    LEFT HEART CATHETERIZATION  2/20/2025    Procedure: Left heart cath;  Surgeon: Ben Ferreira MD;  Location: [facility] CATH;  Service: Cardiology;;    RECTAL SURGERY      fissure repair and hemorroidectomy    THYROIDECTOMY      THYROIDECTOMY      ULNAR NERVE TRANSPOSITION Bilateral        Family History "   Problem Relation Name Age of Onset    Diabetes Father      Hypertension Father      Heart disease Father      Heart disease Unknown      Cancer Mother          pancreatic ?        Social History     Socioeconomic History    Marital status:      Spouse name: Екатерина    Number of children: 2   Tobacco Use    Smoking status: Former     Current packs/day: 0.00     Types: Cigarettes     Quit date: 1973     Years since quittin.6     Passive exposure: Past    Smokeless tobacco: Never   Substance and Sexual Activity    Alcohol use: No    Drug use: Yes     Types: Hydrocodone    Sexual activity: Yes     Partners: Female     Social Drivers of Health     Financial Resource Strain: Low Risk  (2024)    Overall Financial Resource Strain (CARDIA)     Difficulty of Paying Living Expenses: Not very hard   Food Insecurity: No Food Insecurity (2024)    Hunger Vital Sign     Worried About Running Out of Food in the Last Year: Never true     Ran Out of Food in the Last Year: Never true   Transportation Needs: No Transportation Needs (2022)    PRAPARE - Transportation     Lack of Transportation (Medical): No     Lack of Transportation (Non-Medical): No   Physical Activity: Unknown (2024)    Exercise Vital Sign     Days of Exercise per Week: 0 days   Stress: No Stress Concern Present (2024)    [employer] - Occupational Stress Questionnaire     Feeling of Stress : Not at all   Housing Stability: Low Risk  (2022)    Housing Stability Vital Sign     Unable to Pay for Housing in the Last Year: No     Number of Places Lived in the Last Year: 1     Unstable Housing in the Last Year: No       Current Outpatient Medications   Medication Sig Dispense Refill    ACCU-CHEK JOSUE CONTROL SOLN Soln 1 each by Other route as needed (glucose monitoring). 1 each 11    ACCU-CHEK GUIDE GLUCOSE METER Misc       ACCU-CHEK GUIDE TEST STRIPS Strp TEST BLOOD SUGAR TWICE DAILY 200 strip 3     acetaminophen (TYLENOL) 650 MG TbSR Take 1 tablet (650 mg total) by mouth 2 (two) times daily as needed (for mild to moderate pain).  0    alcohol swabs (BD ALCOHOL SWABS) PadM Apply 1 each topically once daily. USE  DAILY 100 each 3    aspirin (ECOTRIN) 81 MG EC tablet One tablet p.o. q.day with food 90 tablet 3    blood sugar diagnostic (ACCU-CHEK GUIDE TEST STRIPS) Strp TEST BLOOD SUGAR FOUR TIMES DAILY 400 strip 1    blood sugar diagnostic Strp Accu-Chek Mckenna Plus test strips      cholecalciferol, vitamin D3, 2,000 unit Tab Take 2 tablets by mouth once daily. Patient taking the 5,000 unit      clopidogreL (PLAVIX) 75 mg tablet Take 75 mg by mouth once daily.      clotrimazole-betamethasone 1-0.05% (LOTRISONE) cream APPLY TOPICALLY TO THE AFFECTED AREA TWICE DAILY AS NEEDED 45 g 3    coenzyme Q10 400 mg Cap Take 400 mg by mouth every morning.      cyanocobalamin, vitamin B-12, (VITAMIN B-12) 1,000 mcg Subl Place 1,000 mcg under the tongue once daily.      docusate sodium (COLACE) 100 MG capsule Take 1 capsule by mouth nightly.      ezetimibe (ZETIA) 10 mg tablet Take 1 tablet (10 mg total) by mouth once daily. 90 tablet 3    ferrous sulfate 325 (65 FE) MG EC tablet Take 1 tablet (325 mg total) by mouth once daily. 90 tablet 1    flaxseed oil 1,000 mg Cap Take 2,000 mg by mouth 2 (two) times daily.      fluocinonide (LIDEX) 0.05 % ointment PRN      furosemide (LASIX) 40 MG tablet TAKE 1 TABLET EVERY MORNING 90 tablet 3    garlic 1,000 mg Cap Take 2,000 mg by mouth 2 (two) times a day.      glimepiride (AMARYL) 1 MG tablet Take 1 tablet (1 mg total) by mouth before breakfast. 90 tablet 3    HYDROcodone-acetaminophen (NORCO) 5-325 mg per tablet Take 1 tablet by mouth daily as needed for Pain. For Diagnosis Codes M54.50 and G89.29 30 tablet 0    ipratropium (ATROVENT HFA) 17 mcg/actuation inhaler Inhale 2 puffs into the lungs every 6 (six) hours. Rescue (Patient taking differently: Inhale 2 puffs into the lungs  every 6 (six) hours. Rescue-PRN) 12.9 g 0    lancets (ACCU-CHEK SOFTCLIX LANCETS) Misc TEST BLOOD SUGAR TWO TIMES DAILY 200 each 3    lancets Misc To check BG QID times daily, to use with insurance preferred meter 100 each 3    lancing device with lancets (ACCU-CHEK FASTCLIX LANCING DEV) Kit 1 kit by Misc.(Non-Drug; Combo Route) route once daily. 1 each 0    levothyroxine (SYNTHROID) 200 MCG tablet TAKE 1 TABLET BEFORE BREAKFAST (TAKEN WITH LEVOTHYROXINE 25 MCG TABLET TO EQUAL 225 MCG DAILY) 90 tablet 3    levothyroxine (SYNTHROID) 25 MCG tablet TAKE 1 TABLET BEFORE BREAKFAST 90 tablet 3    loratadine (CLARITIN) 10 mg tablet Take 10 mg by mouth daily as needed for Allergies.      metFORMIN (GLUCOPHAGE) 1000 MG tablet Take 1 tablet (1,000 mg total) by mouth 2 (two) times daily. 180 tablet 3    metoprolol succinate (TOPROL-XL) 50 MG 24 hr tablet Take 1.5 tablets (75 mg total) by mouth 2 (two) times daily. 270 tablet 3    mupirocin (BACTROBAN) 2 % ointment Apply topically 2 (two) times daily as needed. 90 g 3    mupirocin (BACTROBAN) 2 % ointment APPLY TOPICALLY 2 (TWO) TIMES DAILY AS NEEDED. 88 g 3    nystatin (MYCOSTATIN) powder Apply topically 3 (three) times daily as needed (For yeast rashes). 180 g 3    omeprazole (PRILOSEC) 40 MG capsule TAKE 1 CAPSULE EVERY DAY 90 capsule 3    potassium chloride SA (K-DUR,KLOR-CON M) 10 MEQ tablet TAKE 1 TABLET EVERY MORNING 90 tablet 3    pravastatin (PRAVACHOL) 20 MG tablet TAKE 1 TABLET EVERY EVENING 90 tablet 3    silver sulfADIAZINE 1% (SILVADENE) 1 % cream APPLY TO AFFECTED AREA TWICE DAILY FOR 10 DAYS 50 g 11    spironolactone (ALDACTONE) 50 MG tablet TAKE 1 TABLET (50 MG TOTAL) BY MOUTH EVERY MORNING. 90 tablet 3    SUPER B COMPLEX-VITAMIN C tablet Take 1 tablet by mouth every evening.      telmisartan (MICARDIS) 40 MG Tab TAKE 1 TABLET EVERY MORNING 90 tablet 3    triamcinolone acetonide 0.1% (KENALOG) 0.1 % cream Apply topically 2 (two) times daily as needed (For yeast  rashes). 240 g 3     No current facility-administered medications for this visit.       Review of patient's allergies indicates:   Allergen Reactions    Testosterone Other (See Comments)     Pt states myocardial infarction while on Androgel    Lipitor [atorvastatin] Other (See Comments)     Chest pain      Simvastatin Other (See Comments)     Chest pain     Hydrochlorothiazide      Causes severe hyponatremia         Review of Systems   Constitutional: Negative for chills and fever.   Cardiovascular:  Positive for leg swelling. Negative for claudication.   Skin:  Positive for color change and nail changes.   Musculoskeletal:  Positive for joint swelling. Negative for muscle cramps, muscle weakness and myalgias.   Gastrointestinal:  Negative for nausea and vomiting.   Neurological:  Positive for numbness and paresthesias.   Psychiatric/Behavioral:  Negative for altered mental status.            Objective:      Physical Exam  Constitutional:       Appearance: Normal appearance. He is obese. He is not ill-appearing.   Cardiovascular:      Pulses:           Dorsalis pedis pulses are 2+ on the right side and 2+ on the left side.        Posterior tibial pulses are 2+ on the right side and 2+ on the left side.      Comments: CFT is < 2 seconds bilateral.  Pedal hair growth is decreased bilateral. Varicosities noted bilateral.  Toes are cool to touch bilateral.    Musculoskeletal:         General: Swelling present. No tenderness.      Right lower le+ Edema present.      Left lower leg: Pitting Edema present.      Comments: Muscle strength 5/5 in all muscle groups bilateral.  No tenderness nor crepitation with ROM of foot/ankle joints bilateral.  No tenderness with palpation of bilateral foot and ankle.  Bilateral adductovarus rotation of the 5th toe.   Skin:     General: Skin is warm and dry.      Capillary Refill: Capillary refill takes 2 to 3 seconds.      Findings: No bruising, ecchymosis, erythema, signs of injury,  laceration, lesion, petechiae, rash or wound.      Comments: Pedal skin appears edematous bilateral.  Hemosiderin staining noted to bilateral lower leg.  Toenails x 10 appear mycotic yet well maintained.   Examination of the skin reveals no evidence of significant maceration, rashes, open lesions, suspicious appearing nevi or other concerning lesions.    Neurological:      Mental Status: He is alert.      Sensory: Sensory deficit present.      Motor: No weakness or atrophy.      Comments: Protective sensation per [place]et monofilament is absent bilateral.  Vibratory sensation is absent bilateral.  Light touch absent intact bilateral.               Assessment:       Encounter Diagnoses   Name Primary?    Diabetic polyneuropathy associated with type 2 diabetes mellitus Yes    Venous insufficiency (chronic) (peripheral)     Paresthesia of foot, bilateral          Plan:       Hai was seen today for diabetic foot exam and diabetes mellitus.    Diagnoses and all orders for this visit:    Diabetic polyneuropathy associated with type 2 diabetes mellitus  -     DIABETIC SHOES FOR HOME USE    Venous insufficiency (chronic) (peripheral)    Paresthesia of foot, bilateral      I counseled the patient on his conditions, their implications and medical management.      Rx written for diabetic shoes/insoles to accommodate for digital deformities.    Discussed the importance of diet and exercise as they pertain to diabetes management and maintaining a healthy BMI.    Shoe inspection. Diabetic Foot Education. Patient reminded of the importance of good nutrition and blood sugar control to help prevent podiatric complications of diabetes. Patient instructed on proper foot hygeine. We discussed wearing proper shoe gear, daily foot inspections, never walking without protective shoe gear, never putting sharp instruments to feet    Patient instructed to inspect his feet, wear protective shoe gear when ambulatory, moisturizer to  maintain skin integrity and follow in this office in approximately 12 months, sooner p.r.n.    Follow up in about 3 months (around 10/22/2025).    Matteo Sims DPM

## 2025-07-28 ENCOUNTER — PATIENT MESSAGE (OUTPATIENT)
Dept: PODIATRY | Facility: CLINIC | Age: 75
End: 2025-07-28
Payer: MEDICARE